# Patient Record
Sex: MALE | Race: OTHER | HISPANIC OR LATINO | Employment: FULL TIME | URBAN - METROPOLITAN AREA
[De-identification: names, ages, dates, MRNs, and addresses within clinical notes are randomized per-mention and may not be internally consistent; named-entity substitution may affect disease eponyms.]

---

## 2024-10-29 ENCOUNTER — HOSPITAL ENCOUNTER (EMERGENCY)
Facility: HOSPITAL | Age: 34
Discharge: HOME | End: 2024-10-29
Attending: EMERGENCY MEDICINE

## 2024-10-29 VITALS
OXYGEN SATURATION: 98 % | WEIGHT: 260.13 LBS | DIASTOLIC BLOOD PRESSURE: 89 MMHG | BODY MASS INDEX: 37.24 KG/M2 | RESPIRATION RATE: 18 BRPM | SYSTOLIC BLOOD PRESSURE: 145 MMHG | HEART RATE: 94 BPM | TEMPERATURE: 98 F | HEIGHT: 70 IN

## 2024-10-29 DIAGNOSIS — M79.89 PAIN AND SWELLING OF RIGHT LOWER EXTREMITY: ICD-10-CM

## 2024-10-29 DIAGNOSIS — S93.401A MODERATE RIGHT ANKLE SPRAIN, INITIAL ENCOUNTER: Primary | ICD-10-CM

## 2024-10-29 DIAGNOSIS — M79.604 PAIN AND SWELLING OF RIGHT LOWER EXTREMITY: ICD-10-CM

## 2024-10-29 PROCEDURE — 99283 EMERGENCY DEPT VISIT LOW MDM: CPT | Mod: 25

## 2024-10-29 RX ORDER — HYDROCODONE BITARTRATE AND ACETAMINOPHEN 5; 325 MG/1; MG/1
1 TABLET ORAL EVERY 6 HOURS PRN
Qty: 12 TABLET | Refills: 0 | Status: SHIPPED | OUTPATIENT
Start: 2024-10-29

## 2024-10-29 NOTE — DISCHARGE INSTRUCTIONS
Sin fractura  Hielo, elevar el tobillo  Utilice botas para caminar y muletas  Despues de frederick semana de seguir teniendo dolor, seguimiento con ortopedista  [place] nico sea mecesario para el dolor, no la tome y trabaje o conduzca

## 2024-10-29 NOTE — ED PROVIDER NOTES
Encounter Date: 10/29/2024       History     Chief Complaint   Patient presents with    Ankle Pain     Pt states he twisted his R ankle last night when he was walking down some stairs.      34-year-old male presents to ER stating that yesterday he was coming down a flight of steps at work when he twisted his right ankle.  He has moderate swelling to the lateral ankle.  He states yesterday was able to bear some weight but unable to bear weight today.  No other complaints.    The history is provided by the patient. The history is limited by a language barrier. A  was used.     Review of patient's allergies indicates:   Allergen Reactions    Nsaids (non-steroidal anti-inflammatory drug)      No past medical history on file.  No past surgical history on file.  No family history on file.     Review of Systems   Musculoskeletal:  Positive for arthralgias and joint swelling.   Skin:  Negative for color change and wound.   All other systems reviewed and are negative.      Physical Exam     Initial Vitals [10/29/24 1107]   BP Pulse Resp Temp SpO2   (!) 145/89 94 18 97.6 °F (36.4 °C) 98 %      MAP       --         Physical Exam    Nursing note and vitals reviewed.  Constitutional: He appears well-developed and well-nourished.   HENT:   Head: Normocephalic.   Eyes: EOM are normal.   Neck: Neck supple.   Cardiovascular:  Intact distal pulses.           Pulmonary/Chest: No respiratory distress.   Abdominal: He exhibits no distension.   Musculoskeletal:      Cervical back: Neck supple.        Legs:      Neurological: He is alert and oriented to person, place, and time.   Skin: Skin is warm and dry. Capillary refill takes less than 2 seconds.   Psychiatric: He has a normal mood and affect.         ED Course   Splint Application    Date/Time: 10/29/2024 12:06 PM    Performed by: Lisa Brown FNP  Authorized by: Gurpreet Marcos MD  Location details: right ankle  Splint type: walking boot.  Supplies used:  walking boot.  Post-procedure: The splinted body part was neurovascularly unchanged following the procedure.  Patient tolerance: Patient tolerated the procedure well with no immediate complications        Labs Reviewed - No data to display       Imaging Results              X-Ray Ankle Complete Right (Final result)  Result time 10/29/24 11:46:59      Final result by Gibson Gonzáles MD (10/29/24 11:46:59)                   Impression:      No acute osseous process identified.      Electronically signed by: Gibson Gonzáles  Date:    10/29/2024  Time:    11:46               Narrative:    EXAMINATION:  XR ANKLE COMPLETE 3 VIEW RIGHT    CLINICAL HISTORY:  Pain in right leg    TECHNIQUE:  AP, lateral, and oblique images of the right ankle    COMPARISON:  None    FINDINGS:  Lateral soft tissue swelling.  No acute fracture identified.  Joint alignments are maintained.                        Wet Read by Lisa Brown FNP (10/29/24 11:33:57, [facility] - Emergency Dept, Emergency Medicine)    No osseous abnormalities                                     Medications - No data to display  Medical Decision Making  DDX:  Sprain, closed fracture    34 year old male complaining of left ankle pain after twisting ankle yesterday. Moderate left lateral ankle swelling. X-rays without acute osseous injury. Good pedal pulse and capillary refill. Will place in boot and crutches. F/u in 1 week if continued pain/swelling. Hydrocodone for pain due to NSAID allergy.     Amount and/or Complexity of Data Reviewed  Radiology: ordered and independent interpretation performed. Decision-making details documented in ED Course.    Risk  Prescription drug management.                                      Clinical Impression:  Final diagnoses:  [M79.604, M79.89] Pain and swelling of right lower extremity  [S93.401A] Moderate right ankle sprain, initial encounter (Primary)          ED Disposition Condition    Discharge Stable          ED  Prescriptions       Medication Sig Dispense Start Date End Date Auth. Provider    HYDROcodone-acetaminophen (NORCO) 5-325 mg per tablet Take 1 tablet by mouth every 6 (six) hours as needed for Pain. 12 tablet 10/29/2024 -- Lisa Brown FNP          Follow-up Information    None          Lisa Brown FNP  10/29/24 2984

## 2024-10-29 NOTE — Clinical Note
"Jj Page" Araseli was seen and treated in our emergency department on 10/29/2024.  He may return to work on 11/05/2024.       If you have any questions or concerns, please don't hesitate to call.      Lisa Brown FNP"